# Patient Record
Sex: MALE
[De-identification: names, ages, dates, MRNs, and addresses within clinical notes are randomized per-mention and may not be internally consistent; named-entity substitution may affect disease eponyms.]

---

## 2018-08-27 ENCOUNTER — HOSPITAL ENCOUNTER (EMERGENCY)
Dept: HOSPITAL 97 - ER | Age: 23
Discharge: HOME | End: 2018-08-27
Payer: COMMERCIAL

## 2018-08-27 VITALS — DIASTOLIC BLOOD PRESSURE: 84 MMHG | OXYGEN SATURATION: 100 % | SYSTOLIC BLOOD PRESSURE: 124 MMHG

## 2018-08-27 VITALS — TEMPERATURE: 98.9 F
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
## 2018-08-27 DIAGNOSIS — Z72.0: ICD-10-CM
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
## 2018-08-27 DIAGNOSIS — S62.306A: Primary | ICD-10-CM
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
## 2018-08-27 PROCEDURE — 99283 EMERGENCY DEPT VISIT LOW MDM: CPT

## 2018-08-27 PROCEDURE — 2W3CX1Z IMMOBILIZATION OF RIGHT LOWER ARM USING SPLINT: ICD-10-PCS

## 2018-08-27 NOTE — ER
Nurse's Notes                                                                                     

 [facility]                                                                

Name: Holland Ramos                                                                               

Age: 23 yrs                                                                                       

Sex: Male                                                                                         

: 1995                                                                                   

MRN: [id number]                                                                                   

Arrival Date: 2018                                                                          

Time: 02:35                                                                                       

Account#: [id number]                                                                            

Bed 8                                                                                             

Private MD:                                                                                       

Diagnosis: Closed, acute, non-displaced fracture of right 5th metacarpal                          

                                                                                                  

Presentation:                                                                                     

                                                                                             

02:42 Presenting complaint: Patient states: "I think I broke my hand" Patient report hitting  ao  

      a wall about 3 hours ago. Transition of care: patient was not received from another         

      setting of care. Onset of symptoms was 2018 at 23:00. Risk Assessment: Do        

      you want to hurt yourself or someone else? Patient reports no desire to harm self or        

      others. Initial Sepsis Screen: Does the patient meet any 2 criteria? No. Patient's          

      initial sepsis screen is negative. Does the patient have a suspected source of              

      infection? No. Patient's initial sepsis screen is negative. Care prior to arrival: None.    

02:42 Method Of Arrival: Ambulatory                                                           ao  

02:42 Acuity: LEFTY 4                                                                           ao  

                                                                                                  

Triage Assessment:                                                                                

02:47 General: Appears in no apparent distress. comfortable, Behavior is calm, cooperative.   ao  

      Injury Description: Puncture is Patient punch a wall about three hours ago.                 

                                                                                                  

Historical:                                                                                       

- Allergies:                                                                                      

02:44 No Known Allergies;                                                                     ao  

- Home Meds:                                                                                      

02:44 None [Active];                                                                          ao  

- PMHx:                                                                                           

02:44 None;                                                                                   ao  

- PSHx:                                                                                           

02:44 None;                                                                                   ao  

                                                                                                  

- Immunization history:: Adult Immunizations up to date.                                          

- Social history:: Smoking status: Patient uses tobacco products, denies chronic                  

  smoking, but will smoke occasionally, Patient uses alcohol, occasionally.                       

  Patient/guardian denies using street drugs, IV drugs.                                           

- Ebola Screening: : Patient negative for fever greater than or equal to 101.5 degrees            

  Fahrenheit, and additional compatible Ebola Virus Disease symptoms Patient denies               

  exposure to infectious person No symptoms or risks identified at this time.                     

- Family history:: not pertinent.                                                                 

- Hospitalizations: : No recent hospitalization is reported.                                      

                                                                                                  

                                                                                                  

Screenin:48 Abuse screen: Denies threats or abuse. Denies injuries from another. Nutritional        ao  

      screening: No deficits noted. Tuberculosis screening: No symptoms or risk factors           

      identified. Fall Risk None identified.                                                      

                                                                                                  

Assessment:                                                                                       

02:45 General: Behavior is calm, cooperative. Pain: Complains of pain in left hand Pain does  ao  

      not radiate. Pain currently is 3 out of 10 on a pain scale. Neuro: Level of                 

      Consciousness is awake, alert, obeys commands, Oriented to person, place, time,             

      situation, Appropriate for age Moves all extremities. Full function Speech is normal.       

      Cardiovascular: Denies chest pain, shortness of breath, Capillary refill < 3 seconds        

      Patient's skin is warm and dry. Respiratory: Airway is patent Respiratory effort is         

      even, unlabored, Respiratory pattern is regular, symmetrical. GI: Abdomen is flat,          

      non-distended. : No signs and/or symptoms were reported regarding the genitourinary       

      system. EENT: No signs and/or symptoms were reported regarding the EENT system. Derm:       

      Skin is intact, Skin is pink, warm \T\ dry. normal, Skin temperature is warm.               

      Musculoskeletal: Circulation, motion, and sensation intact. Range of motion: intact in      

      all extremities.                                                                            

04:07 Reassessment: Dc instructions given to patient. patient agree to follow up with Dr javed Paul. No questions at this time.                                                          

                                                                                                  

Vital Signs:                                                                                      

02:43  / 91; Pulse 100; Resp 16; Temp 98.9(O); Pulse Ox 95% on R/A; Weight 47.63 kg     ao  

      (R); Height 5 ft. 3 in. (160.02 cm); Pain 3/10;                                             

03:45  / 84; Pulse 92; Resp 16; Pulse Ox 100% ;                                         ao  

02:43 Body Mass Index 18.60 (47.63 kg, 160.02 cm)                                             ao  

                                                                                                  

ED Course:                                                                                        

02:35 Patient arrived in ED.                                                                  al2 

02:36 Antoni Connolly MD is Attending Physician.                                                rn  

02:41 Baltazar Braga RN is Primary Nurse.                                                       ao  

02:43 Triage completed.                                                                       ao  

02:44 Arm band placed on right wrist. Patient placed in an exam room, on a stretcher, on      ao  

      pulse oximetry, Patient notified of wait time.                                              

02:48 Patient has correct armband on for positive identification. Pulse ox on. NIBP on.       ao  

03:09 X-ray completed. Portable x-ray completed in exam room. Patient tolerated procedure     kw  

      well.                                                                                       

03:10 XRAY Hand RIGHT 3 View In Process Unspecified.                                          EDMS

03:35 Clayton Paul MD is Referral Physician.                                               rn  

04:06 No provider procedures requiring assistance completed. Patient did not have IV access   ao  

      during this emergency room visit.                                                           

                                                                                                  

Administered Medications:                                                                         

No medications were administered                                                                  

                                                                                                  

                                                                                                  

Outcome:                                                                                          

03:36 Discharge ordered by MD.                                                                rn  

04:06 Discharged to home ambulatory.                                                          ao  

04:06 Condition: stable                                                                           

04:06 Discharge instructions given to patient, Instructed on discharge instructions, follow       

      up and referral plans. Demonstrated understanding of instructions, follow-up care.          

04:08 Patient left the ED.                                                                    ao  

                                                                                                  

Signatures:                                                                                       

Dispatcher [employer]                           EDMS                                                 

Antoni Connolly MD MD rn Whitley, Kimberlee kw Ortiz, Alex RN                         Joanne Smith                                                  

                                                                                                  

**************************************************************************************************

## 2018-08-27 NOTE — EDPHYS
Physician Documentation                                                                           

 [facility]                                                                

Name: Holland Ramos                                                                               

Age: 23 yrs                                                                                       

Sex: Male                                                                                         

: 1995                                                                                   

MRN: [id number]                                                                                   

Arrival Date: 2018                                                                          

Time: 02:35                                                                                       

Account#: [id number]                                                                            

Bed 8                                                                                             

Private MD:                                                                                       

ED Physician Antoni Connolly                                                                         

HPI:                                                                                              

                                                                                             

02:58 This 23 yrs old  Male presents to ER via Ambulatory with complaints of Hand     rn  

      Injury, Hand Pain.                                                                          

02:58 The patient or guardian reports injury, pain. The complaints affect the right hand      rn  

      diffusely. Onset: The symptoms/episode began/occurred 1 hour(s) ago. Associated signs       

      and symptoms: Pertinent negatives: cyanosis distally, decreased sensation distally,         

      numbness distally, tingling distally. Severity of symptoms: At their worst the symptoms     

      were moderate, in the emergency department the symptoms are unchanged. The patient has      

      not experienced similar symptoms in the past. Reports punched brick wall with right         

      hand, is right handed, no other injury, + swelling, doesn't think is broken but             

      girlfriend told him to come..                                                               

                                                                                                  

Historical:                                                                                       

- Allergies:                                                                                      

02:44 No Known Allergies;                                                                     ao  

- Home Meds:                                                                                      

02:44 None [Active];                                                                          ao  

- PMHx:                                                                                           

02:44 None;                                                                                   ao  

- PSHx:                                                                                           

02:44 None;                                                                                   ao  

                                                                                                  

- Immunization history:: Adult Immunizations up to date.                                          

- Social history:: Smoking status: Patient uses tobacco products, denies chronic                  

  smoking, but will smoke occasionally, Patient uses alcohol, occasionally.                       

  Patient/guardian denies using street drugs, IV drugs.                                           

- Ebola Screening: : Patient negative for fever greater than or equal to 101.5 degrees            

  Fahrenheit, and additional compatible Ebola Virus Disease symptoms Patient denies               

  exposure to infectious person No symptoms or risks identified at this time.                     

- Family history:: not pertinent.                                                                 

- Hospitalizations: : No recent hospitalization is reported.                                      

                                                                                                  

                                                                                                  

ROS:                                                                                              

02:58 Constitutional: Negative for fever, chills, and weight loss, MS/Extremity: + injury and rn  

      swelling to right hand Skin: + abrasion                                                     

                                                                                                  

Exam:                                                                                             

02:58 Constitutional:  This is a well developed, well nourished patient who is awake, alert,  rn  

      and in no acute distress. MS/ Extremity:  Pulses equal, no cyanosis.  Neurovascular         

      intact.  Full, normal range of motion. No scissoring when making a fist, + tender right     

      4th/5th MCs                                                                                 

                                                                                                  

Vital Signs:                                                                                      

02:43  / 91; Pulse 100; Resp 16; Temp 98.9(O); Pulse Ox 95% on R/A; Weight 47.63 kg     ao  

      (R); Height 5 ft. 3 in. (160.02 cm); Pain 3/10;                                             

03:45  / 84; Pulse 92; Resp 16; Pulse Ox 100% ;                                         ao  

02:43 Body Mass Index 18.60 (47.63 kg, 160.02 cm)                                             ao  

                                                                                                  

MDM:                                                                                              

02:43 Patient medically screened.                                                             rn  

03:34 Differential diagnosis: closed fracture. Data reviewed: vital signs, nurses notes,      rn  

      radiologic studies, plain films, and as a result, I will discharge patient. Counseling:     

      I had a detailed discussion with the patient and/or guardian regarding: the historical      

      points, exam findings, and any diagnostic results supporting the discharge/admit            

      diagnosis, radiology results, the need for outpatient follow up, to return to the           

      emergency department if symptoms worsen or persist or if there are any questions or         

      concerns that arise at home. Special discussion: I discussed with the patient/guardian      

      in detail that at this point there is no indication for admission to the hospital. It       

      is understood, however, that if the symptoms persist or worsen the patient needs to         

      return immediately for re-evaluation. Based on the history and exam findings, there is      

      no indication for further emergent testing or inpatient evaluation. I discussed with        

      the patient/guardian the need to see the orthopedic surgeon for further evaluation of       

      the symptoms. ED course: No scissoring, pain controlled, splinted and will f/u with         

      ortho.                                                                                      

                                                                                                  

                                                                                             

02:46 Order name: XRAY Hand RIGHT 3 View                                                      rn  

                                                                                             

03:36 Order name: Splint - Ulnar Gutter; Complete Time: 04:00                                 rn  

                                                                                                  

Administered Medications:                                                                         

No medications were administered                                                                  

                                                                                                  

                                                                                                  

Disposition:                                                                                      

18 03:36 Discharged to Home. Impression: Closed, acute, non-displaced fracture of right     

  5th metacarpal.                                                                                 

- Condition is Stable.                                                                            

- Discharge Instructions: Boxer's Fracture, Cast or Splint Care, Adult.                           

                                                                                                  

- Medication Reconciliation Form, Thank You Letter, Antibiotic Education, Prescription            

  Opioid Use, Work release form form.                                                             

- Follow up: Clayton Paul MD; When: 5 - 6 days; Reason: Recheck today's complaints,            

  Re-evaluation by your physician.                                                                

- Problem is new.                                                                                 

- Symptoms have improved.                                                                         

                                                                                                  

                                                                                                  

                                                                                                  

Signatures:                                                                                       

Dispatcher [employer]                           EDMS                                                 

Antoni Connolly MD MD rn Ortiz, Alex, RN RN   ao                                                   

                                                                                                  

Corrections: (The following items were deleted from the chart)                                    

04:08 03:36 2018 03:36 Discharged to Home. Impression: Closed, acute, non-displaced     ao  

      fracture of right 5th metacarpal. Condition is Stable. Forms are Medication                 

      Reconciliation Form, Thank You Letter, Antibiotic Education, Prescription Opioid Use.       

      Follow up: Clayton Paul; When: 5 - 6 days; Reason: Recheck today's complaints,             

      Re-evaluation by your physician. Problem is new. Symptoms have improved. rn                 

                                                                                                  

**************************************************************************************************

## 2018-08-27 NOTE — RAD REPORT
EXAM DESCRIPTION:  RAD - Hand Right 3 View - 8/27/2018 3:13 am

 

CLINICAL HISTORY:  Hand pain, blunt force trauma

 

COMPARISON:  None.

 

FINDINGS:  Distal fifth metacarpal fracture is present with very minimal ventral angulation. No distr
action or overlap. Soft tissue swelling is present in the fifth MCP joint region and over the dorsum 
of the hand.

 

No other fracture changes seen. No acute bone or joint finding otherwise noted. No foreign body or ot
her soft tissue abnormality.

 

IMPRESSION:  Distal fifth metacarpal fracture with no significant distraction or angulation.